# Patient Record
Sex: FEMALE | Race: BLACK OR AFRICAN AMERICAN | URBAN - METROPOLITAN AREA
[De-identification: names, ages, dates, MRNs, and addresses within clinical notes are randomized per-mention and may not be internally consistent; named-entity substitution may affect disease eponyms.]

---

## 2020-04-02 ENCOUNTER — ED HISTORICAL/CONVERTED ENCOUNTER (OUTPATIENT)
Dept: OTHER | Age: 55
End: 2020-04-02

## 2020-07-21 VITALS
TEMPERATURE: 98.2 F | BODY MASS INDEX: 49 KG/M2 | HEART RATE: 96 BPM | RESPIRATION RATE: 20 BRPM | OXYGEN SATURATION: 96 % | HEIGHT: 64 IN | DIASTOLIC BLOOD PRESSURE: 98 MMHG | WEIGHT: 287 LBS | SYSTOLIC BLOOD PRESSURE: 165 MMHG

## 2020-07-21 RX ORDER — OMEPRAZOLE 40 MG/1
CAPSULE, DELAYED RELEASE ORAL
COMMUNITY
Start: 2020-07-09

## 2020-07-21 RX ORDER — RISPERIDONE 0.5 MG/1
TABLET, FILM COATED ORAL
COMMUNITY

## 2020-07-21 RX ORDER — PANTOPRAZOLE SODIUM 40 MG/1
TABLET, DELAYED RELEASE ORAL
COMMUNITY
Start: 2020-07-13

## 2020-07-21 RX ORDER — BENAZEPRIL HYDROCHLORIDE 20 MG/1
TABLET ORAL
COMMUNITY
Start: 2020-07-12

## 2020-07-21 RX ORDER — LORATADINE 10 MG/1
TABLET ORAL
COMMUNITY
Start: 2020-05-26

## 2020-07-21 RX ORDER — HYDROCORTISONE 25 MG/G
CREAM TOPICAL
COMMUNITY
Start: 2020-07-10

## 2020-07-21 RX ORDER — FLUTICASONE PROPIONATE 50 MCG
SPRAY, SUSPENSION (ML) NASAL
COMMUNITY
Start: 2020-05-26

## 2020-07-21 RX ORDER — IBUPROFEN 200 MG
TABLET ORAL
COMMUNITY
Start: 2020-06-30

## 2020-07-21 RX ORDER — ALBUTEROL SULFATE 90 UG/1
AEROSOL, METERED RESPIRATORY (INHALATION)
COMMUNITY

## 2020-07-21 RX ORDER — DICYCLOMINE HYDROCHLORIDE 10 MG/1
CAPSULE ORAL
COMMUNITY
Start: 2020-05-26 | End: 2020-11-03

## 2020-07-21 RX ORDER — NICOTINE POLACRILEX 2 MG
LOZENGE BUCCAL
COMMUNITY
Start: 2020-07-12

## 2020-07-21 RX ORDER — CITALOPRAM 20 MG/1
TABLET, FILM COATED ORAL
COMMUNITY
Start: 2020-05-26

## 2020-07-22 ENCOUNTER — TELEPHONE (OUTPATIENT)
Dept: GASTROENTEROLOGY | Age: 55
End: 2020-07-22

## 2020-07-27 ENCOUNTER — TELEPHONE (OUTPATIENT)
Dept: GASTROENTEROLOGY | Age: 55
End: 2020-07-27

## 2020-07-27 NOTE — TELEPHONE ENCOUNTER
Patient called earlier , [facility] told her they did not have RX for Peg [id number]. We sent it on 7-2-2020, and direct faxed it on 7-. I called it in to pharmacist Peter this am and left him a verbal message also. Explained she needs this for today.

## 2020-07-28 ENCOUNTER — TELEPHONE (OUTPATIENT)
Dept: GASTROENTEROLOGY | Age: 55
End: 2020-07-28

## 2020-07-29 NOTE — TELEPHONE ENCOUNTER
I called patient, she wanted to know what her pulse rate was at the hospital yesterday. I explained that I didn't have that info here at the office. She said she was fine, no c/o problems, her procedures went well.

## 2020-11-03 RX ORDER — DICYCLOMINE HYDROCHLORIDE 10 MG/1
CAPSULE ORAL
Qty: 90 CAP | Refills: 3 | Status: SHIPPED | OUTPATIENT
Start: 2020-11-03

## 2021-01-29 VITALS — HEIGHT: 63 IN | WEIGHT: 247 LBS | BODY MASS INDEX: 43.77 KG/M2

## 2021-01-29 PROBLEM — K64.9 HEMORRHOIDS: Status: ACTIVE | Noted: 2021-01-29

## 2021-01-29 PROBLEM — K21.9 ACID REFLUX: Status: ACTIVE | Noted: 2021-01-29

## 2021-01-29 PROBLEM — K21.00 GERD WITH ESOPHAGITIS: Status: ACTIVE | Noted: 2021-01-29

## 2021-01-29 PROBLEM — D64.9 ANEMIA: Status: ACTIVE | Noted: 2021-01-29

## 2021-01-29 PROBLEM — K29.50 CHRONIC ANTRAL GASTRITIS: Status: ACTIVE | Noted: 2021-01-29

## 2021-01-29 PROBLEM — K25.9 GASTRIC ULCER: Status: ACTIVE | Noted: 2021-01-29

## 2021-01-29 PROBLEM — R10.13 EPIGASTRIC PAIN: Status: ACTIVE | Noted: 2021-01-29

## 2021-01-29 PROBLEM — K59.00 CONSTIPATION: Status: ACTIVE | Noted: 2021-01-29

## 2021-01-29 PROBLEM — I10 HYPERTENSIVE DISORDER: Status: ACTIVE | Noted: 2021-01-29

## 2021-01-29 PROBLEM — M19.90 OSTEOARTHRITIS: Status: ACTIVE | Noted: 2021-01-29

## 2021-01-29 PROBLEM — K62.5 RECTAL BLEEDING: Status: ACTIVE | Noted: 2021-01-29

## 2022-02-24 ENCOUNTER — TRANSCRIBE ORDER (OUTPATIENT)
Dept: SCHEDULING | Age: 57
End: 2022-02-24

## 2022-02-24 DIAGNOSIS — M79.604 RIGHT LEG PAIN: Primary | ICD-10-CM

## 2022-03-18 PROBLEM — K59.00 CONSTIPATION: Status: ACTIVE | Noted: 2021-01-29

## 2022-03-18 PROBLEM — K62.5 RECTAL BLEEDING: Status: ACTIVE | Noted: 2021-01-29

## 2022-03-18 PROBLEM — I10 HYPERTENSIVE DISORDER: Status: ACTIVE | Noted: 2021-01-29

## 2022-03-18 PROBLEM — K29.50 CHRONIC ANTRAL GASTRITIS: Status: ACTIVE | Noted: 2021-01-29

## 2022-03-19 PROBLEM — R10.13 EPIGASTRIC PAIN: Status: ACTIVE | Noted: 2021-01-29

## 2022-03-19 PROBLEM — D64.9 ANEMIA: Status: ACTIVE | Noted: 2021-01-29

## 2022-03-19 PROBLEM — M19.90 OSTEOARTHRITIS: Status: ACTIVE | Noted: 2021-01-29

## 2022-03-19 PROBLEM — K21.9 ACID REFLUX: Status: ACTIVE | Noted: 2021-01-29

## 2022-03-20 PROBLEM — K25.9 GASTRIC ULCER: Status: ACTIVE | Noted: 2021-01-29

## 2022-03-20 PROBLEM — K64.9 HEMORRHOIDS: Status: ACTIVE | Noted: 2021-01-29

## 2022-03-20 PROBLEM — K21.00 GERD WITH ESOPHAGITIS: Status: ACTIVE | Noted: 2021-01-29

## 2023-05-18 RX ORDER — CITALOPRAM 20 MG/1
1 TABLET ORAL DAILY
COMMUNITY
Start: 2020-05-26

## 2023-05-18 RX ORDER — BENAZEPRIL HYDROCHLORIDE 20 MG/1
1 TABLET ORAL DAILY
COMMUNITY
Start: 2020-07-12

## 2023-05-18 RX ORDER — POLYETHYLENE GLYCOL 3350 17 G/17G
POWDER, FOR SOLUTION ORAL
COMMUNITY
Start: 2020-07-12

## 2023-05-18 RX ORDER — OMEPRAZOLE 40 MG/1
CAPSULE, DELAYED RELEASE ORAL
COMMUNITY
Start: 2020-07-09

## 2023-05-18 RX ORDER — PANTOPRAZOLE SODIUM 40 MG/1
TABLET, DELAYED RELEASE ORAL
COMMUNITY
Start: 2020-07-13

## 2023-05-18 RX ORDER — ALBUTEROL SULFATE 90 UG/1
AEROSOL, METERED RESPIRATORY (INHALATION)
COMMUNITY

## 2023-05-18 RX ORDER — RISPERIDONE 0.5 MG/1
TABLET ORAL
COMMUNITY

## 2023-05-18 RX ORDER — DICYCLOMINE HYDROCHLORIDE 10 MG/1
1 CAPSULE ORAL 3 TIMES DAILY
COMMUNITY
Start: 2020-11-03

## 2023-05-18 RX ORDER — LORATADINE 10 MG/1
1 TABLET ORAL DAILY
COMMUNITY
Start: 2020-05-26

## 2023-05-18 RX ORDER — NICOTINE 21 MG/24HR
PATCH, TRANSDERMAL 24 HOURS TRANSDERMAL
COMMUNITY
Start: 2020-06-30

## 2023-05-18 RX ORDER — FLUTICASONE PROPIONATE 50 MCG
SPRAY, SUSPENSION (ML) NASAL
COMMUNITY
Start: 2020-05-26